# Patient Record
Sex: MALE | Race: WHITE | NOT HISPANIC OR LATINO | Employment: OTHER | URBAN - METROPOLITAN AREA
[De-identification: names, ages, dates, MRNs, and addresses within clinical notes are randomized per-mention and may not be internally consistent; named-entity substitution may affect disease eponyms.]

---

## 2017-02-03 NOTE — PATIENT DISCUSSION
Some residual macular edema is present post-operatively. Treatment options reviewed. Recommend observation.

## 2020-08-28 ENCOUNTER — NEW PATIENT COMPREHENSIVE (OUTPATIENT)
Dept: URBAN - METROPOLITAN AREA CLINIC 40 | Facility: CLINIC | Age: 80
End: 2020-08-28

## 2020-08-28 DIAGNOSIS — H04.123: ICD-10-CM

## 2020-08-28 DIAGNOSIS — Z96.1: ICD-10-CM

## 2020-08-28 DIAGNOSIS — H01.00A: ICD-10-CM

## 2020-08-28 DIAGNOSIS — H01.00B: ICD-10-CM

## 2020-08-28 DIAGNOSIS — D31.32: ICD-10-CM

## 2020-08-28 PROCEDURE — 92015 DETERMINE REFRACTIVE STATE: CPT

## 2020-08-28 PROCEDURE — 92004 COMPRE OPH EXAM NEW PT 1/>: CPT

## 2020-08-28 ASSESSMENT — VISUAL ACUITY
OS_SC: <J12
OS_CC: J1
OS_SC: 20/40
OD_CC: 20/20
OD_CC: J1
OD_SC: <J12
OS_CC: 20/20
OD_SC: 20/60

## 2020-08-28 ASSESSMENT — TONOMETRY
OS_IOP_MMHG: 12
OD_IOP_MMHG: 12

## 2025-05-07 NOTE — PATIENT DISCUSSION
Intubation    Date/Time: 5/7/2025 12:48 PM    Performed by: Yumiko Anderson CRNA  Authorized by: Austin Otero Jr., MD    Intubation:     Induction:  Intravenous    Intubated:  Postinduction    Mask Ventilation:  Easy mask    Attempts:  1    Attempted By:  CRNA    Method of Intubation:  Video laryngoscopy    Blade:  Lazo 3    Laryngeal View Grade: Grade I - full view of cords      Difficult Airway Encountered?: No      Complications:  None    Airway Device:  Oral endotracheal tube    Airway Device Size:  7.0    Style/Cuff Inflation:  Cuffed (inflated to minimal occlusive pressure)    Inflation Amount (mL):  7    Tube secured:  21    Secured at:  The lips    Placement Verified By:  Capnometry    Complicating Factors:  None    Findings Post-Intubation:  BS equal bilateral and atraumatic/condition of teeth unchanged       No retinal tears or retinal detachment seen on clinical exam today. Reviewed the signs and symptoms of retinal tear/retinal detachment and the importance of calling for prompt evaluation should there be increasing floaters, new flashing lights, or decreasing peripheral vision in either eye at any time. Observation recommended.